# Patient Record
Sex: MALE | Race: OTHER | HISPANIC OR LATINO | URBAN - METROPOLITAN AREA
[De-identification: names, ages, dates, MRNs, and addresses within clinical notes are randomized per-mention and may not be internally consistent; named-entity substitution may affect disease eponyms.]

---

## 2023-07-18 ENCOUNTER — EMERGENCY (EMERGENCY)
Facility: HOSPITAL | Age: 33
LOS: 1 days | Discharge: HOME | End: 2023-07-18
Attending: EMERGENCY MEDICINE
Payer: SELF-PAY

## 2023-07-18 VITALS
WEIGHT: 219.8 LBS | DIASTOLIC BLOOD PRESSURE: 90 MMHG | TEMPERATURE: 98 F | OXYGEN SATURATION: 96 % | RESPIRATION RATE: 15 BRPM | HEART RATE: 76 BPM | SYSTOLIC BLOOD PRESSURE: 143 MMHG

## 2023-07-18 PROCEDURE — 99283 EMERGENCY DEPT VISIT LOW MDM: CPT

## 2023-07-18 PROCEDURE — 99053 MED SERV 10PM-8AM 24 HR FAC: CPT

## 2023-07-18 NOTE — ED ADULT TRIAGE NOTE - CHIEF COMPLAINT QUOTE
redness to b/l eyes, itching since this am. also c/o congestion, subj fevers, body aches, cough since saturday

## 2023-07-19 PROCEDURE — 99283 EMERGENCY DEPT VISIT LOW MDM: CPT

## 2023-07-19 PROCEDURE — T1013: CPT

## 2023-07-19 RX ORDER — ERYTHROMYCIN BASE 5 MG/GRAM
1 OINTMENT (GRAM) OPHTHALMIC (EYE) ONCE
Refills: 0 | Status: COMPLETED | OUTPATIENT
Start: 2023-07-19 | End: 2023-07-19

## 2023-07-19 RX ORDER — ERYTHROMYCIN BASE 5 MG/GRAM
1 OINTMENT (GRAM) OPHTHALMIC (EYE)
Qty: 1 | Refills: 0
Start: 2023-07-19 | End: 2023-07-25

## 2023-07-19 RX ADMIN — Medication 1 APPLICATION(S): at 00:45

## 2023-07-19 NOTE — ED PROVIDER NOTE - OBJECTIVE STATEMENT
32 yo male no PMHx presents to ED c/o eye redness x1 day. +Tearing and crusting. Additionally since Saturday with body aches. No further complaints at this time.   Denies visual complaints, contact/glasses.

## 2023-07-19 NOTE — ED PROVIDER NOTE - NSFOLLOWUPINSTRUCTIONS_ED_ALL_ED_FT
- Prescription sent to pharmacy.    - Erythromycin ointment: instill 1 drop to each eye 4x/day for 7 days.   - Please bring all documentation from your ED visit to any related future follow up appointment.  - Please call to schedule follow up appointment with your primary care physician within 24-48 hours.  - Please seek immediate medical attention or return to the ED for any new/worsening, signs/symptoms, or concerns.    Feel better!     - Receta enviada a farmacia.    - Pomada de eritromicina: instilar 1 gota en cada amrita 4x/día mady 7 días.  - Traiga toda la documentación de blanton visita al ED a cualquier tosha de seguimiento futura relacionada.  - Llame para programar flip tosha de seguimiento con blanton médico de atención primaria dentro de las 24 a 48 horas.  - Busque atención médica inmediata o regrese al servicio de urgencias por cualquier signo/síntoma o inquietud nuevos/empeorados.    ¡Sentirse mejor!    Conjuntivitis viral en los adultos  Viral Conjunctivitis, Adult       La conjuntivitis viral es la inflamación de la membrana transparente que cubre la parte gwendolyn del amrita y la superficie interna del párpado (conjuntiva). La inflamación es causada por flip infección viral. Los vasos sanguíneos de la conjuntiva se agrandan, el amrita se vuelve de color villegas o joy, y a menudo puede picar. Por lo general, comienza en un amrita y se extiende al otro tras mary o dos días. Las infecciones suelen resolverse en el término de 1 a 2 semanas. La conjuntivitis viral es contagiosa. [place] significa que puede transmitirse fácilmente de flip persona a otra. A menudo, a esta afección se la denomina amrita joy.    ¿Cuáles son las causas?  La causa de esta afección es un virus. Puede propagarse al tocar objetos contaminados con el virus, ami las manijas de las reinier o las toallas, y luego tocarse el amrita. También se puede transmitir a través de gotitas diminutas, al toser o estornudar.    ¿Qué incrementa el riesgo?  Es más probable que presente esta afección si tiene un resfrío o gripe, o si está en contacto directo con flip persona que tiene conjuntivitis.    ¿Cuáles son los signos o síntomas?  Los síntomas de esta afección incluyen:    Enrojecimiento en el amrita.   Lagrimeo u ojos llorosos.  Irritación y picazón en los ojos.  Sensación de ardor en los ojos.  Secreción transparente de los ojos.  Hinchazón de los párpados.  Sensación de tener arena en los ojos.  Sensibilidad a la stephanie.    Esta afección suele estar acompañada de otros síntomas, ami congestión nasal, tos y fiebre.    ¿Cómo se diagnostica?  Esta afección se diagnostica mediante flip revisión de los antecedentes médicos y un examen físico. Si tiene secreción en el amrita, esta se puede analizar para descartar otras causas de conjuntivitis.    ¿Cómo se trata?  La conjuntivitis viral no responde a los medicamentos que eliminan bacterias (antibióticos). Por lo general, la afección desaparece sin tratamiento en el transcurso de 1 a 2 semanas. Si se necesita tratamiento, [place]aney apunta a aliviar los síntomas y prevenir la propagación de la infección. [place] se puede realizar con lágrimas artificiales en gotas, antihistamínicos en gotas u otros medicamentos para los ojos. En contadas ocasiones, se pueden recetar gotas con corticoesteroides o antivirales para el herpes.    Siga estas instrucciones en blanton casa:      Medicamentos     [place] o aplíquese los medicamentos de venta kimberley y los recetados solamente ami se lo haya indicado el médico.  No toque el borde del párpado con el frasco de las gotas oftálmicas ni con el tubo de la pomada cuando aplique los medicamentos en el amrita afectado. [place] evitará que la infección se propague al otro amrita o a otras personas.        Cuidado de los ojos    Evite tocarse o frotarse los ojos.  Aplíquese un paño limpio, frío y húmedo en el amrita mady 10 a 20 minutos, 3 o 4 veces al día o ami se lo haya indicado el médico.  Si usa lentes de contacto, no los use hasta que haya desaparecido la inflamación y blanton médico le indique que es seguro usarlos nuevamente. Pregunte a blanton médico cómo desinfectar o reemplazar ayan lentes de contacto antes de usarlos nuevamente. Use anteojos hasta que pueda volver a usar los lentes de contacto.  Evite usar maquillaje en los ojos hasta que la inflamación se haya alida. Descarte cosméticos viejos para los ojos que puedan estar contaminados.  Si se formaron costras en el amrita, retírelas suavemente con un paño húmedo o con flip torunda de algodón.        Instrucciones generales    Cambie o lave la jimena de la almohada todos los días o ami se lo haya indicado el médico.  No comparta toallas, fundas de almohadas, toallitas para la estiven, maquillaje para los ojos, brochas de maquillaje, lentes de contacto ni anteojos. [place] puede propagar la infección.  Lávese las mark frecuentemente con agua y jabón. Use toallas de papel para secarse las mark. Use desinfectante para mark si no dispone de agua y jabón.  Evite el contacto con otras personas hasta que el amrita ya no esté villegas y con lágrimas, o ami se lo haya indicado el médico.    Comuníquese con un médico si:  Los síntomas empeoran o no mejoran con el tratamiento.  Siente un dolor cada vez más intenso.  La visión se vuelve borrosa.  Tiene fiebre.  Siente dolor u observa hinchazón o enrojecimiento en la estiven.  Le sale flip secreción amarilla o robert del amrita.  Aparecen nuevos síntomas.    Solicite ayuda de inmediato si:  Siente dolor intenso.  La visión empeora mucho.    Resumen  La conjuntivitis viral es la inflamación de la membrana transparente que cubre la parte gwendolyn del amrita y la superficie interna del párpado. Suele desaparecer en el término de 1 a 2 semanas.  Por lo general, esta afección se trata con medicamentos y compresas frías. El tratamiento se centra en el alivio de los síntomas.  Esta afección es muy contagiosa. Para prevenir flip infección, evite el contacto cercano con otras personas, lávese las mark con frecuencia y no comparta toallas ni toallitas para la estiven.  Comuníquese con un médico si los síntomas no desaparecen con el tratamiento, o si tiene más dolor, lora visión o hinchazón en los ojos.  Solicite ayuda de inmediato si tiene dolor intenso o si la visión empeora mucho.    NOTAS ADICIONALES E INSTRUCCIONES    Please follow up with your Primary MD in 24-48 hr.  Seek immediate medical care for any new/worsening signs or symptoms.

## 2023-07-19 NOTE — ED PROVIDER NOTE - PATIENT PORTAL LINK FT
You can access the FollowMyHealth Patient Portal offered by [facility] by registering at the following website: [email or web address]. By joining [facility]’s FollowMyHealth portal, you will also be able to view your health information using other applications (apps) compatible with our system.

## 2023-07-19 NOTE — ED PROVIDER NOTE - NS ED ATTENDING STATEMENT MOD
This was a shared visit with the S[facility]. I reviewed and verified the documentation and independently performed the documented:

## 2023-07-19 NOTE — ED PROVIDER NOTE - CLINICAL SUMMARY MEDICAL DECISION MAKING FREE TEXT BOX
32 yo male no PMHx presents to ED c/o b/l eye redness x1 day. +Tearing and body aches. Likely viral, but will treat with topical abx. Medically stable for discharge.

## 2023-07-19 NOTE — ED PROVIDER NOTE - PHYSICAL EXAMINATION
Gen: Nontoxic, well appearing, in NAD.  Skin: Warm and dry as visualized.  Head: NC/AT.  Eyes: PERRLA. EOMI. +Tearing and conjunctival injection b/l. No discharge.   Ears: No external canal erythema or edema. TM pearly, grey.   Neck: Supple, FROM. Trachea midline.   Resp: No distress.  Cardio: Well perfused.  Ext: No deformities. MAEx4. FROM.   Neuro: A&Ox3. Appears nonfocal.   Psych: Normal affect and mood.

## 2023-08-21 ENCOUNTER — EMERGENCY (EMERGENCY)
Facility: HOSPITAL | Age: 33
LOS: 1 days | Discharge: HOME | End: 2023-08-21
Attending: EMERGENCY MEDICINE
Payer: SELF-PAY

## 2023-08-21 VITALS
RESPIRATION RATE: 16 BRPM | WEIGHT: 220.46 LBS | DIASTOLIC BLOOD PRESSURE: 80 MMHG | TEMPERATURE: 98 F | OXYGEN SATURATION: 96 % | HEART RATE: 76 BPM | SYSTOLIC BLOOD PRESSURE: 124 MMHG | HEIGHT: 71 IN

## 2023-08-21 PROCEDURE — T1013: CPT

## 2023-08-21 PROCEDURE — 99283 EMERGENCY DEPT VISIT LOW MDM: CPT

## 2023-08-21 PROCEDURE — 99053 MED SERV 10PM-8AM 24 HR FAC: CPT

## 2023-08-21 PROCEDURE — 99284 EMERGENCY DEPT VISIT MOD MDM: CPT

## 2023-08-21 RX ORDER — DIPHENHYDRAMINE HCL 50 MG
1 CAPSULE ORAL
Qty: 20 | Refills: 0
Start: 2023-08-21 | End: 2023-08-25

## 2023-08-21 RX ORDER — FAMOTIDINE 10 MG/ML
1 INJECTION INTRAVENOUS
Qty: 14 | Refills: 0
Start: 2023-08-21 | End: 2023-08-27

## 2023-08-21 RX ORDER — DIPHENHYDRAMINE HCL 50 MG
50 CAPSULE ORAL ONCE
Refills: 0 | Status: COMPLETED | OUTPATIENT
Start: 2023-08-21 | End: 2023-08-21

## 2023-08-21 RX ORDER — FAMOTIDINE 10 MG/ML
40 INJECTION INTRAVENOUS ONCE
Refills: 0 | Status: COMPLETED | OUTPATIENT
Start: 2023-08-21 | End: 2023-08-21

## 2023-08-21 RX ADMIN — FAMOTIDINE 40 MILLIGRAM(S): 10 INJECTION INTRAVENOUS at 04:45

## 2023-08-21 RX ADMIN — Medication 50 MILLIGRAM(S): at 04:46

## 2023-08-21 RX ADMIN — Medication 50 MILLIGRAM(S): at 04:45

## 2023-08-21 NOTE — ED PROVIDER NOTE - CLINICAL SUMMARY MEDICAL DECISION MAKING FREE TEXT BOX
33 y,o male no Pmh no hx of allergic presents in Er and c.o rash on his arms, legs , chest abd belly and back started after about 4 pm yesterday after he had dinner - he had chicken and rice and passion fruit juice that rash started - its only itches a lot - he did not take any med - denies any rash on the pulm of the hand or feet . denies any SOb- cough or chest tightness . denies any fever or chills , recent travel , ne taking of the medication . denies any one else has same rash at home  rash resemble to hives - Pepcid and benadryl and prednisone   possible rash due to passion fruits ?

## 2023-08-21 NOTE — ED ADULT TRIAGE NOTE - CHIEF COMPLAINT QUOTE
Rash/hives to entire body since 0200. Pt endorses itching, denies difficulty breathing, SOB, new exposers, NKA.

## 2023-08-21 NOTE — ED PROVIDER NOTE - PATIENT PORTAL LINK FT
You can access the FollowMyHealth Patient Portal offered by [facility] by registering at the following website: [email or web address]. By joining [employer]’s FollowMyHealth portal, you will also be able to view your health information using other applications (apps) compatible with our system.

## 2023-08-21 NOTE — ED PROVIDER NOTE - NSFOLLOWUPINSTRUCTIONS_ED_ALL_ED_FT
take medication as prescribed   call and follow up with [facility]   Ronchas  Hives    Las ronchas son zonas enrojecidas e hinchadas en la piel que ocasionan picazón. Las ronchas pueden aparecer en cualquier parte del cuerpo. Las ronchas suelen mejorar en el transcurso de 24 horas (ronchas agudas). Pueden aparecer ronchas nuevas después de que las [place] desaparecen. [place] puede seguir mady muchos días o semanas (ronchas crónicas). No se transmiten de persona a persona (no soncontagiosas).    Las ronchas son causadas por la respuesta del organismo a algo a lo que usted es alérgico (alérgeno). A veces se los llama factores desencadenantes. Puede tener ronchas inmediatamente después de estar cerca de un factor desencadenante u horas más tarde.    ¿Cuáles son las causas?  Las alergias a los alimentos.  Las picaduras o mordeduras de insectos.  Polen.  Mascotas.  Látex.  Productos químicos.  Pasar tiempo a la stephanie del sol, al calor o al frío.  Realizar actividad física.  Estrés.  Algunos medicamentos.  Virus. [place] incluye el resfrío común.  Las infecciones causadas por gérmenes (bacterias).  Vacunas contra la alergia.  Transfusiones de siddhartha.    A veces, la causa es desconocida.    ¿Qué incrementa el riesgo?  Ser vj.  Ser alérgico a alimentos tales ami:    Frutas cítricas.  Leche.  Huevos.  Maníes.  Gerald secos.  Mariscos.  Ser alérgico a:    Medicamentos.  Látex.  Insectos.  Animales.  Polen.    ¿Cuáles son los signos o los síntomas?     Protuberancias o áreas elevadas en la piel, de color villegas o cochran y que producen picazón. Estas áreas:    Se hacen grandes y se hinchan.  Cambian de forma y ubicación.  Aparecen solas o se conectan entre sí en flip gran área de piel.  Pinchan o causar un dolor punzante.  Pueden volverse david (palidecer) al presionar en el centro.    En casos muy graves, las mark, los pies y el janine también pueden hincharse. [place] puede ocurrir si las ronchas comienzan en las capas profundas de la piel.    ¿Cómo se trata?  El tratamiento de esta afección depende de los síntomas. El tratamiento puede incluir:    Usar paños fríos y húmedos (compresas frías) o jose duchas con agua fría para detener la picazón.  Medicamentos para lo siguiente:    Aliviar la picazón (antihistamínicos).  Reducir la hinchazón (corticoesteroides).  Tratar la infección (antibióticos).  Un medicamento (omalizumab) que se aplica ami flip inyección. El médico puede recetarle esto si usted presenta ronchas que no mejoran aún después de otros tratamientos.  En casos muy graves, puede necesitar flip inyección de un medicamento denominado epinefrinapara prevenir flip reacción alérgica potencialmente mortal (anafilaxis).    Siga estas indicaciones en blanton casa:      Medicamentos    [place] o aplique los medicamentos de venta kimberley y los recetados solamente ami se lo haya indicado el médico.  Si le recetaron un antibiótico, tómelo ami se lo haya indicado el médico. No deje de usarlo aunque comience a sentirse mejor.        Cuidado de la piel    Aplique paños fríos y húmedos en las ronchas.  No se rasque la piel. No se frote la piel.        Indicaciones generales    No se duche ni tome rachna de inmersión con [place]. Podría empeorar la picazón.  No use ropa ajustada.  Aplíquese pantalla solar y use ropas que le cubran la piel cuando esté al aire kimberley.  Evite los factores desencadenantes que le causan las ronchas. Lleve un registro para realizar un seguimiento de aquello que le causa ronchas. Escriba los siguientes datos:    Los medicamentos que armin.  Lo que usted come y sharda.  Los productos que usa en la piel.  Concurra a todas las visitas de seguimiento ami se lo haya indicado el médico. [place] es importante.    Comuníquese con un médico si:  Los síntomas no mejoran con los medicamentos.  Le duelen las articulaciones o están hinchadas.    Solicite ayuda inmediatamente si:  Tiene fiebre.  Siente dolor en el vientre (abdomen).  Tiene la lengua o los labios hinchados.  Tiene los párpados hinchados.  Siente el pecho o la garganta cerrados.  Tiene problemas para respirar o tragar.    Estos síntomas pueden indicar flip emergencia. No espere a ector si los síntomas desaparecen. Solicite atención médica de inmediato. Comuníquese con el servicio de emergencias de blanton localidad (911 en los [place]). No conduzca por ayan propios medios hasta el hospital.    Resumen  Las ronchas son zonas enrojecidas e hinchadas en la piel que ocasionan picazón.  El tratamiento de esta afección depende de los síntomas.  Evite las cosas que causan las ronchas. Lleve un registro para realizar un seguimiento de aquello que le causa ronchas.  [place] y aplíquese los medicamentos de venta kimberley y los recetados solamente ami se lo haya indicado el médico.  Concurra a todas las visitas de seguimiento ami se lo haya indicado el médico. [place] es importante.    NOTAS ADICIONALES E INSTRUCCIONES    Please follow up with your Primary MD in 24-48 hr.  Seek immediate medical care for any new/worsening signs or symptoms.

## 2023-08-21 NOTE — ED ADULT NURSE NOTE - OBJECTIVE STATEMENT
pt presents to ed with hives all over his body.  t reports itching.  denies angioedema.  nka that hes aware of.  NAD at this time.

## 2023-08-21 NOTE — ED ADULT NURSE NOTE - NSFALLUNIVINTERV_ED_ALL_ED
Bed/Stretcher in lowest position, wheels locked, appropriate side rails in place/Call bell, personal items and telephone in reach/Instruct patient to call for assistance before getting out of bed/chair/stretcher/Non-slip footwear applied when patient is off stretcher/[place] to call system/Physically safe environment - no spills, clutter or unnecessary equipment/Purposeful proactive rounding/Room/bathroom lighting operational, light cord in reach

## 2023-08-21 NOTE — ED PROVIDER NOTE - PHYSICAL EXAMINATION
Const: AOX3 nontoxic appearing, no apparent respiratory or physical distress. Stable gait   HEENT: NC/AT. Moist mucous membranes.  Eyes: RAPHAEL. EOMI  Neck: Soft and supple. Full ROM without pain.  Cardiac: Regular rate and regular rhythm. +S1/S2. No murmurs.   Resp: Speaking in full sentences. No evidence of respiratory distress. No wheezes, rales or rhonchi. No adventitious breath sounds   Abd: Soft, non-tender, non-distended.   Back: Spine midline and non-tender. No CVAT.  Skin: diffused patchy like rash noted on the  b/l arms, legs , and groin and chest and back area - with define border blanching easily-  no rash on the pulm of the hand or sole of the leg   Neuro: Awake, alert & oriented x 3. Moves all extremities symmetrically.

## 2023-08-21 NOTE — ED PROVIDER NOTE - ATTENDING APP SHARED VISIT CONTRIBUTION OF CARE
32 yo presents to ED c/o itchy rash all over his body which started yesterday afternoon.  No new foods, medications or exposures.  Pt denies any change invoice, difficulty swallowing or breathing.  Rash c/w urticaria.  Rx Benadryl, Pepcid and Prednisone with f/u as outpt

## 2023-08-21 NOTE — ED PROVIDER NOTE - OBJECTIVE STATEMENT
33 y,o male no Pmh no hx of allergic presents in [place] and c.o rash on his arms, legs , chest abd belly and back started after about 4 pm yesterday after he had dinner - he had chicken and rice and passion fruit juice that rash started - its only itches a lot - he did not take any med - denies any rash on the pulm of the hand or feet . denies any SOb- cough or chest tightness . denies any fever or chills , recent travel , ne taking of the medication . denies any one else has same rash at home